# Patient Record
Sex: MALE | Race: WHITE | NOT HISPANIC OR LATINO | Employment: OTHER | URBAN - NONMETROPOLITAN AREA
[De-identification: names, ages, dates, MRNs, and addresses within clinical notes are randomized per-mention and may not be internally consistent; named-entity substitution may affect disease eponyms.]

---

## 2021-01-01 ENCOUNTER — TRANSCRIBE ORDERS (OUTPATIENT)
Dept: CARDIOLOGY | Facility: CLINIC | Age: 71
End: 2021-01-01

## 2021-01-01 DIAGNOSIS — I35.0 AORTIC VALVE STENOSIS, ETIOLOGY OF CARDIAC VALVE DISEASE UNSPECIFIED: Primary | ICD-10-CM

## 2021-01-01 DIAGNOSIS — R01.1 MURMUR: ICD-10-CM

## 2022-01-01 ENCOUNTER — OFFICE VISIT (OUTPATIENT)
Dept: FAMILY MEDICINE CLINIC | Facility: CLINIC | Age: 72
End: 2022-01-01

## 2022-01-01 VITALS
HEART RATE: 93 BPM | BODY MASS INDEX: 22.11 KG/M2 | WEIGHT: 166.8 LBS | OXYGEN SATURATION: 100 % | HEIGHT: 73 IN | SYSTOLIC BLOOD PRESSURE: 110 MMHG | DIASTOLIC BLOOD PRESSURE: 60 MMHG

## 2022-01-01 DIAGNOSIS — I73.9 PVD (PERIPHERAL VASCULAR DISEASE): ICD-10-CM

## 2022-01-01 DIAGNOSIS — F41.9 ANXIETY: Primary | ICD-10-CM

## 2022-01-01 DIAGNOSIS — R42 DIZZINESS: ICD-10-CM

## 2022-01-01 DIAGNOSIS — E11.9 TYPE 2 DIABETES MELLITUS WITHOUT COMPLICATION, WITHOUT LONG-TERM CURRENT USE OF INSULIN: ICD-10-CM

## 2022-01-01 DIAGNOSIS — R63.4 WEIGHT LOSS: ICD-10-CM

## 2022-01-01 DIAGNOSIS — I10 PRIMARY HYPERTENSION: ICD-10-CM

## 2022-01-01 DIAGNOSIS — E78.2 MIXED HYPERLIPIDEMIA: ICD-10-CM

## 2022-01-01 DIAGNOSIS — I95.2 HYPOTENSION DUE TO DRUGS: ICD-10-CM

## 2022-01-01 DIAGNOSIS — J30.89 ALLERGIC RHINITIS CAUSED BY FEATHERS: ICD-10-CM

## 2022-01-01 DIAGNOSIS — R11.0 NAUSEA: ICD-10-CM

## 2022-01-01 PROCEDURE — 99214 OFFICE O/P EST MOD 30 MIN: CPT | Performed by: FAMILY MEDICINE

## 2022-01-01 RX ORDER — CITALOPRAM 40 MG/1
40 TABLET ORAL DAILY
Qty: 90 TABLET | Refills: 1 | Status: SHIPPED | OUTPATIENT
Start: 2022-01-01

## 2022-01-01 RX ORDER — METFORMIN HYDROCHLORIDE 500 MG/1
TABLET, EXTENDED RELEASE ORAL
COMMUNITY
Start: 2021-01-01 | End: 2022-01-01 | Stop reason: SDUPTHER

## 2022-01-01 RX ORDER — ONDANSETRON 4 MG/1
4 TABLET, FILM COATED ORAL EVERY 8 HOURS PRN
Qty: 30 TABLET | Refills: 2 | Status: SHIPPED | OUTPATIENT
Start: 2022-01-01

## 2022-01-01 RX ORDER — TROSPIUM CHLORIDE ER 60 MG/1
60 CAPSULE ORAL DAILY
COMMUNITY
Start: 2022-01-01

## 2022-03-21 NOTE — PROGRESS NOTES
"Chief Complaint  Abdominal Pain (Go over CT results) and Fall (Patient said he is unsteady on his feet)    Subjective          Rasta Childs presents to [facility]  Patient has recently had problems with nausea weight loss anxiety and decreased appetite.  Patient states he is also been somewhat dizzy when he walks for any distance.  Patient was evaluated in the emergency room had blood work done which showed he was anemic but was stable.  Patient also had a CT scan of the abdomen pelvis which is relatively unremarkable.  Patient states all this started when his wife was in the hospital admitted for Covid pneumonia and she is [facility] proximately 2 months in the hospital and rehab.  Patient reports he has not been able to improve since her discharge and continues to feel tired weak and at times extremely nauseated.      Objective   Vital Signs:   /60   Pulse 93   Ht 185.4 cm (73\")   Wt 75.7 kg (166 lb 12.8 oz)   SpO2 100%   BMI 22.01 kg/m²     Body mass index is 22.01 kg/m².    Review of Systems   Constitutional: Positive for appetite change, fatigue and unexpected weight loss.   HENT: Negative for congestion, dental problem, ear discharge, ear pain and sore throat.    Respiratory: Negative for apnea, chest tightness and shortness of breath.    Gastrointestinal: Positive for abdominal pain and nausea. Negative for constipation.   Endocrine: Negative for polyuria.   Genitourinary: Negative for difficulty urinating.   Musculoskeletal: Negative for arthralgias and gait problem.   Skin: Negative for rash.   Neurological: Positive for dizziness.   Hematological: Negative for adenopathy.       Past History:  Medical History: has a past medical history of Allergic rhinitis, Anxiety, Aortic stenosis, mild (05/23/2017), Atrial fibrillation (HCC), Atrial flutter (HCC) (05/22/2017), Benign prostatic hyperplasia, Bilateral cataracts, Bilateral tinnitus, Broken ribs (05/2017), Cirrhosis (HCC), " Collar bone fracture (05/2017), COPD (chronic obstructive pulmonary disease) ([facility]), Degenerative joint disease involving multiple joints, Dependence on continuous positive airway pressure ventilation, Depression, Diverticular disease, Essential hypertension, Family history of colon cancer, Hiatal hernia, High risk medication use, Hip fracture ([facility]) (05/2017), History of adenomatous polyp of colon, History of tobacco use, Internal hemorrhoids, Mild aortic insufficiency, Mixed hyperlipidemia, Non-rheumatic aortic stenosis, Nuclear senile cataract, Obstructive sleep apnea syndrome, Open angle with borderline glaucoma findings, bilateral, Osteoarthritis, Seborrheic dermatitis, Sensorineural hearing loss, bilateral, Simple chronic bronchitis ([facility]), Type 2 diabetes mellitus ([facility]), Typical atrial flutter ([facility]), and Urine test positive for microalbuminuria.   Surgical History: has a past surgical history that includes Colonoscopy (11/2014) and Pilonidal cyst / sinus excision.       (Not in a hospital admission)     Allergies: Cetirizine hcl and Statins    Physical Exam  Constitutional:       Appearance: Normal appearance. He is ill-appearing.   HENT:      Head: Normocephalic.      Right Ear: External ear normal.      Left Ear: External ear normal.      Nose: Nose normal.      Mouth/Throat:      Pharynx: Oropharynx is clear.   Eyes:      Pupils: Pupils are equal, round, and reactive to light.   Cardiovascular:      Rate and Rhythm: Normal rate and regular rhythm.      Pulses: Normal pulses.   Pulmonary:      Effort: Pulmonary effort is normal.      Breath sounds: Normal breath sounds.   Abdominal:      General: Abdomen is flat. Bowel sounds are normal.      Palpations: Abdomen is soft.   Musculoskeletal:         General: Normal range of motion.   Skin:     General: Skin is warm and dry.   Neurological:      General: No focal deficit present.      Mental Status: He is alert and oriented to person, place, and time.           Result Review :                   Assessment and Plan    Diagnoses and all orders for this visit:    1. Anxiety (Primary)  Comments:  We will increase patient's citalopram to 40 mg daily and discussed with him increasing diet at and trying to eat more foods presently.    Orders:  -     citalopram (CeleXA) 40 MG tablet; Take 1 tablet by mouth Daily.  Dispense: 90 tablet; Refill: 1    2. Primary hypertension    3. Type 2 diabetes mellitus without complication, without long-term current use of insulin ([facility])  Comments:  Patient's blood sugars have been good recently because of weight loss and decrease in diet patient is advised to continue to monitor    4. Mixed hyperlipidemia    5. PVD (peripheral vascular disease) ([facility])    6. Allergic rhinitis caused by feathers    7. Nausea  Comments:  Discussed brat diet and other foods and will add on Urban and daily to see if this can help with his nausea  Orders:  -     ondansetron (Zofran) 4 MG tablet; Take 1 tablet by mouth Every 8 (Eight) Hours As Needed for Nausea or Vomiting.  Dispense: 30 tablet; Refill: 2    8. Weight loss    9. Dizziness    10. Hypotension due to drugs  Comments:  Patient's blood pressure was low today at 110/58 we will stop lisinopril and see if dizziness improves              Follow Up   Return in about 2 weeks (around 4/4/2022).  Patient was given instructions and counseling regarding his condition or for health maintenance advice. Please see specific information pulled into the AVS if appropriate.     Grayson Kincaid MD

## 2022-03-21 NOTE — PATIENT INSTRUCTIONS
Stop lisinopril.  Patient to increase citalopram from 20 mg to 40 mg.  Start on Urban and 4 mg every 8 hours as needed for nausea patient is to recheck in 2 weeks